# Patient Record
Sex: MALE | Race: WHITE
[De-identification: names, ages, dates, MRNs, and addresses within clinical notes are randomized per-mention and may not be internally consistent; named-entity substitution may affect disease eponyms.]

---

## 2017-04-05 ENCOUNTER — HOSPITAL ENCOUNTER (EMERGENCY)
Dept: HOSPITAL 47 - EC | Age: 27
Discharge: HOME | End: 2017-04-05
Payer: COMMERCIAL

## 2017-04-05 VITALS
DIASTOLIC BLOOD PRESSURE: 52 MMHG | RESPIRATION RATE: 17 BRPM | SYSTOLIC BLOOD PRESSURE: 104 MMHG | HEART RATE: 69 BPM | TEMPERATURE: 97.5 F

## 2017-04-05 DIAGNOSIS — E86.0: ICD-10-CM

## 2017-04-05 DIAGNOSIS — K52.9: Primary | ICD-10-CM

## 2017-04-05 DIAGNOSIS — F17.200: ICD-10-CM

## 2017-04-05 DIAGNOSIS — R11.2: ICD-10-CM

## 2017-04-05 LAB
ALP SERPL-CCNC: 67 U/L (ref 38–126)
ALT SERPL-CCNC: 52 U/L (ref 21–72)
AMYLASE SERPL-CCNC: 120 U/L (ref 30–110)
ANION GAP SERPL CALC-SCNC: 17 MMOL/L
APTT BLD: 21.6 SEC (ref 22–30)
AST SERPL-CCNC: 52 U/L (ref 17–59)
BASOPHILS # BLD AUTO: 0.1 K/UL (ref 0–0.2)
BASOPHILS NFR BLD AUTO: 1 %
BUN SERPL-SCNC: 23 MG/DL (ref 9–20)
CALCIUM SPEC-MCNC: 10.4 MG/DL (ref 8.4–10.2)
CH: 30.3
CHCM: 34.6
CHLORIDE SERPL-SCNC: 101 MMOL/L (ref 98–107)
CO2 SERPL-SCNC: 28 MMOL/L (ref 22–30)
EOSINOPHIL # BLD AUTO: 0.2 K/UL (ref 0–0.7)
EOSINOPHIL NFR BLD AUTO: 2 %
ERYTHROCYTE [DISTWIDTH] IN BLOOD BY AUTOMATED COUNT: 5.65 M/UL (ref 4.3–5.9)
ERYTHROCYTE [DISTWIDTH] IN BLOOD: 13.1 % (ref 11.5–15.5)
GLUCOSE SERPL-MCNC: 101 MG/DL (ref 74–99)
HCT VFR BLD AUTO: 49.7 % (ref 39–53)
HDW: 2.34
HGB BLD-MCNC: 16.9 GM/DL (ref 13–17.5)
INR PPP: 1 (ref ?–1.1)
LUC NFR BLD AUTO: 2 %
LYMPHOCYTES # SPEC AUTO: 1.3 K/UL (ref 1–4.8)
LYMPHOCYTES NFR SPEC AUTO: 15 %
MCH RBC QN AUTO: 30 PG (ref 25–35)
MCHC RBC AUTO-ENTMCNC: 34.1 G/DL (ref 31–37)
MCV RBC AUTO: 88 FL (ref 80–100)
MONOCYTES # BLD AUTO: 0.4 K/UL (ref 0–1)
MONOCYTES NFR BLD AUTO: 5 %
NEUTROPHILS # BLD AUTO: 6.9 K/UL (ref 1.3–7.7)
NEUTROPHILS NFR BLD AUTO: 76 %
NON-AFRICAN AMERICAN GFR(MDRD): >60
PARTICLE COUNT: 7793
PH UR: 6.5 [PH] (ref 5–8)
POTASSIUM SERPL-SCNC: 4.2 MMOL/L (ref 3.5–5.1)
PROT SERPL-MCNC: 8.4 G/DL (ref 6.3–8.2)
PROT UR QL: (no result)
PT BLD: 10.5 SEC (ref 9–12)
RBC UR QL: 1 /HPF (ref 0–5)
SODIUM SERPL-SCNC: 146 MMOL/L (ref 137–145)
SP GR UR: 1.03 (ref 1–1.03)
UA BILLING (MACRO VS. MICRO): (no result)
UROBILINOGEN UR QL STRIP: <2 MG/DL (ref ?–2)
WBC # BLD AUTO: 0.15 10*3/UL
WBC # BLD AUTO: 9 K/UL (ref 3.8–10.6)
WBC #/AREA URNS HPF: 5 /HPF (ref 0–5)
WBC (PEROX): 8.65

## 2017-04-05 PROCEDURE — 83690 ASSAY OF LIPASE: CPT

## 2017-04-05 PROCEDURE — 85025 COMPLETE CBC W/AUTO DIFF WBC: CPT

## 2017-04-05 PROCEDURE — 74000: CPT

## 2017-04-05 PROCEDURE — 99284 EMERGENCY DEPT VISIT MOD MDM: CPT

## 2017-04-05 PROCEDURE — 81001 URINALYSIS AUTO W/SCOPE: CPT

## 2017-04-05 PROCEDURE — 82150 ASSAY OF AMYLASE: CPT

## 2017-04-05 PROCEDURE — 85730 THROMBOPLASTIN TIME PARTIAL: CPT

## 2017-04-05 PROCEDURE — 96375 TX/PRO/DX INJ NEW DRUG ADDON: CPT

## 2017-04-05 PROCEDURE — 36415 COLL VENOUS BLD VENIPUNCTURE: CPT

## 2017-04-05 PROCEDURE — 80053 COMPREHEN METABOLIC PANEL: CPT

## 2017-04-05 PROCEDURE — 96361 HYDRATE IV INFUSION ADD-ON: CPT

## 2017-04-05 PROCEDURE — 85610 PROTHROMBIN TIME: CPT

## 2017-04-05 PROCEDURE — 96374 THER/PROPH/DIAG INJ IV PUSH: CPT

## 2017-04-05 NOTE — ED
General Adult HPI





- General


Source: patient (And girlfriend), RN notes reviewed


Mode of arrival: ambulatory


Limitations: no limitations





<Jacob Madsen - Last Filed: 04/05/17 06:14>





<Mason Pena - Last Filed: 04/05/17 08:25>





- General


Chief complaint: Abdominal Pain


Stated complaint: NAUSEA,VOMITING,ABD PAIN


Time Seen by Provider: 04/05/17 06:09





- History of Present Illness


Initial comments: 





Patient is a pleasant 26-year-old male presenting to emergency Department with 

nausea vomiting abdominal discomfort.  Onset was just the past couple hours.  

Symptoms woke him from sleep.  No known bad food exposure.  No fevers.  No 

history of chronic problems.  Patient has dry heaved several times with one 

episode of emesis.  Patient has discomfort mostly in the epigastric region.  

Majority of history is taken from girlfriend. (Jacob Madsen)





- Related Data


 Previous Rx's











 Medication  Instructions  Recorded


 


Dicyclomine [Bentyl] 10 mg PO TID #15 capsule 04/05/17











 Allergies











Allergy/AdvReac Type Severity Reaction Status Date / Time


 


No Known Allergies Allergy   Verified 04/05/17 07:37














Review of Systems


ROS Other: All systems not noted in ROS Statement are negative.


Constitutional: Denies: fever


Eyes: Denies: eye pain


ENT: Denies: ear pain


Respiratory: Denies: cough


Cardiovascular: Denies: chest pain


Endocrine: Denies: fatigue


Gastrointestinal: Reports: abdominal pain, nausea, vomiting.  Denies: diarrhea


Genitourinary: Denies: dysuria


Musculoskeletal: Denies: back pain


Skin: Denies: rash


Neurological: Denies: weakness





<Jacob Madsen - Last Filed: 04/05/17 06:14>


ROS Other: All systems not noted in ROS Statement are negative.





<Mason Pena - Last Filed: 04/05/17 08:25>


ROS Statement: 


Those systems with pertinent positive or pertinent negative responses have been 

documented in the HPI.








Past Medical History


Past Medical History: No Reported History


History of Any Multi-Drug Resistant Organisms: None Reported


Past Surgical History: No Surgical Hx Reported


Past Psychological History: No Psychological Hx Reported


Smoking Status: Current every day smoker


Past Alcohol Use History: Occasional


Past Drug Use History: None Reported





<Jacob Madsen - Last Filed: 04/05/17 06:14>





General Exam


Limitations: no limitations


General appearance: alert


Head exam: Present: atraumatic, normocephalic


Eye exam: Present: normal appearance


Neck exam: Present: normal inspection


Respiratory exam: Present: normal lung sounds bilaterally


Cardiovascular Exam: Present: regular rate, normal rhythm


  ** Expanded


Peripheral pulses: 2+: Dorsalis Pedis (R), Dorsalis Pedis (L)


GI/Abdominal exam: Present: soft, tenderness (Mild diffuse tenderness), normal 

bowel sounds.  Absent: distended, guarding, rebound, rigid, pulsatile mass


Extremities exam: Present: normal inspection


Neurological exam: Present: alert


Psychiatric exam: Present: normal affect, normal mood


Skin exam: Absent: rash





<Jacob Madsen - Last Filed: 04/05/17 06:14>





Medical Decision Making





<Jacob Madsen - Last Filed: 04/05/17 06:14>





- Lab Data


Result diagrams: 


 04/05/17 06:25





 04/05/17 06:25





- Radiology Data


Radiology results: report reviewed (I did review the x-ray report is consistent 

with enteritis.), image reviewed





<Mason Pena - Last Filed: 04/05/17 08:25>





- Medical Decision Making





I did review the patient's labs and x-rays and did discuss the findings with 

patient and reevaluated on several occasions I did discuss findings also with 

the patient's mother with the patient's permission.  Patient does demonstrate 

evidence of enteritis secondary likely to a viral infection patient will be 

discharged he is feeling much improved at this time.  We discharged and 

instructed to increase oral fluids appropriate medication no for work today. (

Mason Pena)





- Lab Data





 Lab Results











  04/05/17 04/05/17 04/05/17 Range/Units





  06:25 06:25 06:25 


 


WBC   9.0   (3.8-10.6)  k/uL


 


RBC   5.65   (4.30-5.90)  m/uL


 


Hgb   16.9   (13.0-17.5)  gm/dL


 


Hct   49.7   (39.0-53.0)  %


 


MCV   88.0   (80.0-100.0)  fL


 


MCH   30.0   (25.0-35.0)  pg


 


MCHC   34.1   (31.0-37.0)  g/dL


 


RDW   13.1   (11.5-15.5)  %


 


Plt Count   156   (150-450)  k/uL


 


Neutrophils %   76   %


 


Lymphocytes %   15   %


 


Monocytes %   5   %


 


Eosinophils %   2   %


 


Basophils %   1   %


 


Neutrophils #   6.9   (1.3-7.7)  k/uL


 


Lymphocytes #   1.3   (1.0-4.8)  k/uL


 


Monocytes #   0.4   (0-1.0)  k/uL


 


Eosinophils #   0.2   (0-0.7)  k/uL


 


Basophils #   0.1   (0-0.2)  k/uL


 


PT    10.5  (9.0-12.0)  sec


 


INR    1.0  (<1.1)  


 


APTT    21.6 L  (22.0-30.0)  sec


 


Sodium  146 H    (137-145)  mmol/L


 


Potassium  4.2    (3.5-5.1)  mmol/L


 


Chloride  101    ()  mmol/L


 


Carbon Dioxide  28    (22-30)  mmol/L


 


Anion Gap  17    mmol/L


 


BUN  23 H    (9-20)  mg/dL


 


Creatinine  1.29 H    (0.66-1.25)  mg/dL


 


Est GFR (MDRD) Af Amer  >60    (>60 ml/min/1.73 sqM)  


 


Est GFR (MDRD) Non-Af  >60    (>60 ml/min/1.73 sqM)  


 


Glucose  101 H    (74-99)  mg/dL


 


Calcium  10.4 H    (8.4-10.2)  mg/dL


 


Total Bilirubin  0.6    (0.2-1.3)  mg/dL


 


AST  52    (17-59)  U/L


 


ALT  52    (21-72)  U/L


 


Alkaline Phosphatase  67    ()  U/L


 


Total Protein  8.4 H    (6.3-8.2)  g/dL


 


Albumin  5.4 H    (3.5-5.0)  g/dL


 


Amylase  120 H    ()  U/L


 


Lipase  513 H    ()  U/L


 


Urine Color     


 


Urine Appearance     (Clear)  


 


Urine pH     (5.0-8.0)  


 


Ur Specific Gravity     (1.001-1.035)  


 


Urine Protein     (Negative)  


 


Urine Glucose (UA)     (Negative)  


 


Urine Ketones     (Negative)  


 


Urine Blood     (Negative)  


 


Urine Nitrite     (Negative)  


 


Urine Bilirubin     (Negative)  


 


Urine Urobilinogen     (<2.0)  mg/dL


 


Ur Leukocyte Esterase     (Negative)  


 


Urine RBC     (0-5)  /hpf


 


Urine WBC     (0-5)  /hpf


 


Urine Mucus     (None)  /hpf














  04/05/17 Range/Units





  07:42 


 


WBC   (3.8-10.6)  k/uL


 


RBC   (4.30-5.90)  m/uL


 


Hgb   (13.0-17.5)  gm/dL


 


Hct   (39.0-53.0)  %


 


MCV   (80.0-100.0)  fL


 


MCH   (25.0-35.0)  pg


 


MCHC   (31.0-37.0)  g/dL


 


RDW   (11.5-15.5)  %


 


Plt Count   (150-450)  k/uL


 


Neutrophils %   %


 


Lymphocytes %   %


 


Monocytes %   %


 


Eosinophils %   %


 


Basophils %   %


 


Neutrophils #   (1.3-7.7)  k/uL


 


Lymphocytes #   (1.0-4.8)  k/uL


 


Monocytes #   (0-1.0)  k/uL


 


Eosinophils #   (0-0.7)  k/uL


 


Basophils #   (0-0.2)  k/uL


 


PT   (9.0-12.0)  sec


 


INR   (<1.1)  


 


APTT   (22.0-30.0)  sec


 


Sodium   (137-145)  mmol/L


 


Potassium   (3.5-5.1)  mmol/L


 


Chloride   ()  mmol/L


 


Carbon Dioxide   (22-30)  mmol/L


 


Anion Gap   mmol/L


 


BUN   (9-20)  mg/dL


 


Creatinine   (0.66-1.25)  mg/dL


 


Est GFR (MDRD) Af Amer   (>60 ml/min/1.73 sqM)  


 


Est GFR (MDRD) Non-Af   (>60 ml/min/1.73 sqM)  


 


Glucose   (74-99)  mg/dL


 


Calcium   (8.4-10.2)  mg/dL


 


Total Bilirubin   (0.2-1.3)  mg/dL


 


AST   (17-59)  U/L


 


ALT   (21-72)  U/L


 


Alkaline Phosphatase   ()  U/L


 


Total Protein   (6.3-8.2)  g/dL


 


Albumin   (3.5-5.0)  g/dL


 


Amylase   ()  U/L


 


Lipase   ()  U/L


 


Urine Color  Yellow  


 


Urine Appearance  Clear  (Clear)  


 


Urine pH  6.5  (5.0-8.0)  


 


Ur Specific Gravity  1.026  (1.001-1.035)  


 


Urine Protein  1+ H  (Negative)  


 


Urine Glucose (UA)  Negative  (Negative)  


 


Urine Ketones  Trace H  (Negative)  


 


Urine Blood  Negative  (Negative)  


 


Urine Nitrite  Negative  (Negative)  


 


Urine Bilirubin  Negative  (Negative)  


 


Urine Urobilinogen  <2.0  (<2.0)  mg/dL


 


Ur Leukocyte Esterase  Small H  (Negative)  


 


Urine RBC  1  (0-5)  /hpf


 


Urine WBC  5  (0-5)  /hpf


 


Urine Mucus  Few H  (None)  /hpf














Disposition





<Jacob Madsen - Last Filed: 04/05/17 06:14>





<Mason Pena - Last Filed: 04/05/17 08:25>


Clinical Impression: 


 Gastroenteritis, Dehydration, Abdominal pain





Disposition: HOME SELF-CARE


Condition: Good


Instructions:  Abdominal Pain (ED), Gastroenteritis (ED), Dehydration (ED)


Prescriptions: 


Dicyclomine [Bentyl] 10 mg PO TID #15 capsule

## 2017-04-05 NOTE — XR
EXAMINATION TYPE: XR KUB

 

DATE OF EXAM: 4/5/2017 6:46 AM

 

CLINICAL DATA:  26-year-old male with abdominal pain, epigastric pain with nausea and vomiting, PHH

 

COMPARISON:  None

 

FINDINGS:

 

Lung bases are clear. 

 

No evidence for free intraperitoneal air. 

 

No dilated small bowel. Air is seen throughout the colon with multiple air-fluid levels. No abnormal 
bowel dilatation. There is mild stool in the distal colon.

 

No suspicious calcifications seen.

 

 

IMPRESSION:

 

1. Air-fluid levels throughout the colon suggesting liquid stool. Correlate for enteritis.

2. No evidence of bowel obstruction or free intraperitoneal air.

## 2018-06-10 ENCOUNTER — HOSPITAL ENCOUNTER (EMERGENCY)
Dept: HOSPITAL 47 - EC | Age: 28
Discharge: HOME | End: 2018-06-10
Payer: COMMERCIAL

## 2018-06-10 VITALS — TEMPERATURE: 97.4 F

## 2018-06-10 VITALS — SYSTOLIC BLOOD PRESSURE: 115 MMHG | RESPIRATION RATE: 16 BRPM | HEART RATE: 44 BPM | DIASTOLIC BLOOD PRESSURE: 69 MMHG

## 2018-06-10 DIAGNOSIS — F17.200: ICD-10-CM

## 2018-06-10 DIAGNOSIS — R10.11: Primary | ICD-10-CM

## 2018-06-10 DIAGNOSIS — R11.2: ICD-10-CM

## 2018-06-10 LAB
ALBUMIN SERPL-MCNC: 4.3 G/DL (ref 3.5–5)
ALP SERPL-CCNC: 51 U/L (ref 38–126)
ALT SERPL-CCNC: 44 U/L (ref 21–72)
AMYLASE SERPL-CCNC: 53 U/L (ref 30–110)
ANION GAP SERPL CALC-SCNC: 14 MMOL/L
APTT BLD: 24.6 SEC (ref 22–30)
AST SERPL-CCNC: 28 U/L (ref 17–59)
BASOPHILS # BLD AUTO: 0 K/UL (ref 0–0.2)
BASOPHILS NFR BLD AUTO: 1 %
BUN SERPL-SCNC: 15 MG/DL (ref 9–20)
CALCIUM SPEC-MCNC: 9.6 MG/DL (ref 8.4–10.2)
CHLORIDE SERPL-SCNC: 99 MMOL/L (ref 98–107)
CO2 SERPL-SCNC: 29 MMOL/L (ref 22–30)
EOSINOPHIL # BLD AUTO: 0.1 K/UL (ref 0–0.7)
EOSINOPHIL NFR BLD AUTO: 1 %
ERYTHROCYTE [DISTWIDTH] IN BLOOD BY AUTOMATED COUNT: 5.13 M/UL (ref 4.3–5.9)
ERYTHROCYTE [DISTWIDTH] IN BLOOD: 12.8 % (ref 11.5–15.5)
GLUCOSE SERPL-MCNC: 102 MG/DL (ref 74–99)
HCT VFR BLD AUTO: 44.7 % (ref 39–53)
HGB BLD-MCNC: 15.6 GM/DL (ref 13–17.5)
INR PPP: 1.1 (ref ?–1.2)
LIPASE SERPL-CCNC: 19 U/L (ref 23–300)
LYMPHOCYTES # SPEC AUTO: 1.9 K/UL (ref 1–4.8)
LYMPHOCYTES NFR SPEC AUTO: 23 %
MCH RBC QN AUTO: 30.3 PG (ref 25–35)
MCHC RBC AUTO-ENTMCNC: 34.8 G/DL (ref 31–37)
MCV RBC AUTO: 87.1 FL (ref 80–100)
MONOCYTES # BLD AUTO: 0.4 K/UL (ref 0–1)
MONOCYTES NFR BLD AUTO: 5 %
NEUTROPHILS # BLD AUTO: 5.9 K/UL (ref 1.3–7.7)
NEUTROPHILS NFR BLD AUTO: 69 %
PH UR: 7 [PH] (ref 5–8)
PLATELET # BLD AUTO: 148 K/UL (ref 150–450)
POTASSIUM SERPL-SCNC: 3.9 MMOL/L (ref 3.5–5.1)
PROT SERPL-MCNC: 6.5 G/DL (ref 6.3–8.2)
PT BLD: 10.5 SEC (ref 9–12)
RBC UR QL: 5 /HPF (ref 0–5)
SODIUM SERPL-SCNC: 142 MMOL/L (ref 137–145)
SP GR UR: 1.02 (ref 1–1.03)
SQUAMOUS UR QL AUTO: <1 /HPF (ref 0–4)
UROBILINOGEN UR QL STRIP: <2 MG/DL (ref ?–2)
WBC # BLD AUTO: 8.6 K/UL (ref 3.8–10.6)

## 2018-06-10 PROCEDURE — 80053 COMPREHEN METABOLIC PANEL: CPT

## 2018-06-10 PROCEDURE — 82150 ASSAY OF AMYLASE: CPT

## 2018-06-10 PROCEDURE — 96374 THER/PROPH/DIAG INJ IV PUSH: CPT

## 2018-06-10 PROCEDURE — 81001 URINALYSIS AUTO W/SCOPE: CPT

## 2018-06-10 PROCEDURE — 85730 THROMBOPLASTIN TIME PARTIAL: CPT

## 2018-06-10 PROCEDURE — 99284 EMERGENCY DEPT VISIT MOD MDM: CPT

## 2018-06-10 PROCEDURE — 83690 ASSAY OF LIPASE: CPT

## 2018-06-10 PROCEDURE — 36415 COLL VENOUS BLD VENIPUNCTURE: CPT

## 2018-06-10 PROCEDURE — 85610 PROTHROMBIN TIME: CPT

## 2018-06-10 PROCEDURE — 96361 HYDRATE IV INFUSION ADD-ON: CPT

## 2018-06-10 PROCEDURE — 85025 COMPLETE CBC W/AUTO DIFF WBC: CPT

## 2018-06-10 NOTE — ED
Abdominal Pain HPI





- General


Chief Complaint: Abdominal Pain


Stated Complaint: Abdominal Pain


Time Seen by Provider: 06/10/18 21:11


Source: patient, RN notes reviewed


Mode of arrival: ambulatory


Limitations: no limitations





- History of Present Illness


Initial Comments: 


This is a 28-year-old male who presents to the emergency department with chief 

complaint of abdominal pain.  Patient is accompanied by his father who 

contributes to history.  Patient states that for the past 6-8 months he has 

been experiencing intermittent abdominal pain.  He states that he has been seen 

here 3 times and at [facility] 2 times.  His last visit to [facility] was 2 days ago 

where he did have a computed tomography scan performed.  Patient reports right 

upper quadrant abdominal pain that feels like he is being punched in the gut.  

He states the pain is made worse after eating.  He states that over the past 6 

months he has lost 15 pounds.  He states he is unable to eat.  He admits to 

associated nausea and vomiting.  Denies fevers or chills, chest pain shortness 

breath, constipation or diarrhea.  He denies any flank pain.  Denies dysuria or 

hematuria.  Father is at bedside and states that he is very concerned.  He 

states that patient is normally very active and well and not something is going 

on.  He states the patient does have a follow-up with gastroenterology in 3 

weeks but that patient cannot wait that long.








- Related Data


 Previous Rx's











 Medication  Instructions  Recorded


 


Dicyclomine [Bentyl] 10 mg PO TID #15 capsule 04/05/17











 Allergies











Allergy/AdvReac Type Severity Reaction Status Date / Time


 


No Known Allergies Allergy   Verified 06/10/18 20:39














Review of Systems


ROS Statement: 


Those systems with pertinent positive or pertinent negative responses have been 

documented in the HPI.





ROS Other: All systems not noted in ROS Statement are negative.





Past Medical History


Past Medical History: No Reported History


History of Any Multi-Drug Resistant Organisms: None Reported


Past Surgical History: No Surgical Hx Reported


Past Psychological History: No Psychological Hx Reported


Smoking Status: Current every day smoker


Past Alcohol Use History: Occasional


Past Drug Use History: None Reported





General Exam





- General Exam Comments


Initial Comments: 


General: Awake and alert, well-developed; in no apparent distress.


HEENT: Head atraumatic, normocephalic. Pupils are equal, round and reactive to 

light. Extraocular movements intact. Oropharynx moist without erythema or 

exudate. 


Neck: Supple. Normal ROM. 


Cardiovascular: Regular rate and rhythm. No murmurs, rubs or gallops. Chest 

symmetrical.  


Respiratory: Lungs clear to auscultation bilaterally. No wheezes, rales or 

rhonchi. Normal respiratory effort with no use of accessory muscles. 


Abdomen: Soft, non-distended.  Mild tenderness on palpation of right upper 

quadrant.  No rigidity, rebound or guarding. Normal bowel sounds in all 4 

quadrants. 


Musculoskeletal: Normal ROM, no tenderness bilateral upper and lower 

extremities. Ambulating normally. 


Skin: Pink, warm and dry without rashes or lesions. 


Neurological: Alert and oriented x3. CN II-XII grossly intact. Speech is fluent 

and answers are appropriate. No focal neuro deficits. 


Psychiatric: Normal mood and affect. No overt signs of depression or anxiety 

noted. 














Limitations: no limitations





Course


 Vital Signs











  06/10/18





  20:37


 


Temperature 97.4 F L


 


Pulse Rate 52 L


 


Respiratory 18





Rate 


 


Blood Pressure 103/60


 


O2 Sat by Pulse 98





Oximetry 














Medical Decision Making





- Medical Decision Making


This is a 28-year-old male who presents to the emergency department with chief 

complaint of abdominal pain.  Patient has been having intermittent abdominal 

pain for the past 6-8 months.  He has been evaluated multiple times at this ER 

and [facility].  He was seen at [facility] 2 days ago with the same 

issue.  Patient states that his symptoms have not worsened but that they have 

not subsided.  He complains of right upper quadrant abdominal pain, nausea and 

vomiting.  CBC, CMP, coags and UA were unremarkable.  I was able to obtain the 

report from the computed tomography scan the patient had performed 2 days ago 

at [facility].  This revealed a tiny amount of free fluid in the pelvis but no 

definitive minute other acute abnormality identified.  While in the emergency 

department, patient's vital signs have been stable and he is in no acute 

distress.  Patient will be given contact information for follow-up to GI.  

Patient is in agreement with plan voices understanding.  He is in no acute 

distress at this time.  All questions answered.








- Lab Data


Result diagrams: 


 06/10/18 21:02





 06/10/18 21:02


 Lab Results











  06/10/18 06/10/18 06/10/18 Range/Units





  21:02 21:02 21:02 


 


WBC   8.6   (3.8-10.6)  k/uL


 


RBC   5.13   (4.30-5.90)  m/uL


 


Hgb   15.6   (13.0-17.5)  gm/dL


 


Hct   44.7   (39.0-53.0)  %


 


MCV   87.1   (80.0-100.0)  fL


 


MCH   30.3   (25.0-35.0)  pg


 


MCHC   34.8   (31.0-37.0)  g/dL


 


RDW   12.8   (11.5-15.5)  %


 


Plt Count   148 L   (150-450)  k/uL


 


Neutrophils %   69   %


 


Lymphocytes %   23   %


 


Monocytes %   5   %


 


Eosinophils %   1   %


 


Basophils %   1   %


 


Neutrophils #   5.9   (1.3-7.7)  k/uL


 


Lymphocytes #   1.9   (1.0-4.8)  k/uL


 


Monocytes #   0.4   (0-1.0)  k/uL


 


Eosinophils #   0.1   (0-0.7)  k/uL


 


Basophils #   0.0   (0-0.2)  k/uL


 


PT    10.5  (9.0-12.0)  sec


 


INR    1.1  (<1.2)  


 


APTT    24.6  (22.0-30.0)  sec


 


Sodium  142    (137-145)  mmol/L


 


Potassium  3.9    (3.5-5.1)  mmol/L


 


Chloride  99    ()  mmol/L


 


Carbon Dioxide  29    (22-30)  mmol/L


 


Anion Gap  14    mmol/L


 


BUN  15    (9-20)  mg/dL


 


Creatinine  1.00    (0.66-1.25)  mg/dL


 


Est GFR (CKD-EPI)AfAm  >90    (>60 ml/min/1.73 sqM)  


 


Est GFR (CKD-EPI)NonAf  >90    (>60 ml/min/1.73 sqM)  


 


Glucose  102 H    (74-99)  mg/dL


 


Calcium  9.6    (8.4-10.2)  mg/dL


 


Total Bilirubin  0.2    (0.2-1.3)  mg/dL


 


AST  28    (17-59)  U/L


 


ALT  44    (21-72)  U/L


 


Alkaline Phosphatase  51    ()  U/L


 


Total Protein  6.5    (6.3-8.2)  g/dL


 


Albumin  4.3    (3.5-5.0)  g/dL


 


Amylase  53    ()  U/L


 


Lipase  19 L    ()  U/L


 


Urine Color     


 


Urine Appearance     (Clear)  


 


Urine pH     (5.0-8.0)  


 


Ur Specific Gravity     (1.001-1.035)  


 


Urine Protein     (Negative)  


 


Urine Glucose (UA)     (Negative)  


 


Urine Ketones     (Negative)  


 


Urine Blood     (Negative)  


 


Urine Nitrite     (Negative)  


 


Urine Bilirubin     (Negative)  


 


Urine Urobilinogen     (<2.0)  mg/dL


 


Ur Leukocyte Esterase     (Negative)  


 


Urine RBC     (0-5)  /hpf


 


Ur Squamous Epith Cells     (0-4)  /hpf


 


Amorphous Sediment     (None)  /hpf


 


Urine Mucus     (None)  /hpf














  06/10/18 Range/Units





  21:02 


 


WBC   (3.8-10.6)  k/uL


 


RBC   (4.30-5.90)  m/uL


 


Hgb   (13.0-17.5)  gm/dL


 


Hct   (39.0-53.0)  %


 


MCV   (80.0-100.0)  fL


 


MCH   (25.0-35.0)  pg


 


MCHC   (31.0-37.0)  g/dL


 


RDW   (11.5-15.5)  %


 


Plt Count   (150-450)  k/uL


 


Neutrophils %   %


 


Lymphocytes %   %


 


Monocytes %   %


 


Eosinophils %   %


 


Basophils %   %


 


Neutrophils #   (1.3-7.7)  k/uL


 


Lymphocytes #   (1.0-4.8)  k/uL


 


Monocytes #   (0-1.0)  k/uL


 


Eosinophils #   (0-0.7)  k/uL


 


Basophils #   (0-0.2)  k/uL


 


PT   (9.0-12.0)  sec


 


INR   (<1.2)  


 


APTT   (22.0-30.0)  sec


 


Sodium   (137-145)  mmol/L


 


Potassium   (3.5-5.1)  mmol/L


 


Chloride   ()  mmol/L


 


Carbon Dioxide   (22-30)  mmol/L


 


Anion Gap   mmol/L


 


BUN   (9-20)  mg/dL


 


Creatinine   (0.66-1.25)  mg/dL


 


Est GFR (CKD-EPI)AfAm   (>60 ml/min/1.73 sqM)  


 


Est GFR (CKD-EPI)NonAf   (>60 ml/min/1.73 sqM)  


 


Glucose   (74-99)  mg/dL


 


Calcium   (8.4-10.2)  mg/dL


 


Total Bilirubin   (0.2-1.3)  mg/dL


 


AST   (17-59)  U/L


 


ALT   (21-72)  U/L


 


Alkaline Phosphatase   ()  U/L


 


Total Protein   (6.3-8.2)  g/dL


 


Albumin   (3.5-5.0)  g/dL


 


Amylase   ()  U/L


 


Lipase   ()  U/L


 


Urine Color  Yellow  


 


Urine Appearance  Cloudy  (Clear)  


 


Urine pH  7.0  (5.0-8.0)  


 


Ur Specific Gravity  1.019  (1.001-1.035)  


 


Urine Protein  Trace H  (Negative)  


 


Urine Glucose (UA)  Negative  (Negative)  


 


Urine Ketones  Negative  (Negative)  


 


Urine Blood  Negative  (Negative)  


 


Urine Nitrite  Negative  (Negative)  


 


Urine Bilirubin  Negative  (Negative)  


 


Urine Urobilinogen  <2.0  (<2.0)  mg/dL


 


Ur Leukocyte Esterase  Negative  (Negative)  


 


Urine RBC  5  (0-5)  /hpf


 


Ur Squamous Epith Cells  <1  (0-4)  /hpf


 


Amorphous Sediment  Occasional H  (None)  /hpf


 


Urine Mucus  Rare H  (None)  /hpf














Disposition


Clinical Impression: 


 Abdominal pain, Nausea and vomiting





Disposition: HOME SELF-CARE


Condition: Good


Instructions:  Abdominal Pain (ED)


Additional Instructions: 


Please follow-up with Dr. Esqueda, gastroenterology within 1-2 days. Please 

follow up with primary care provider within 1-2 days. Return to emergency 

department if symptoms should worsen or any concerns arise. 


Is patient prescribed a controlled substance at d/c from ED?: No


Referrals: 


Gregg Pastor MD [Primary Care Provider] - 1-2 days


Danay Esqueda MD [STAFF PHYSICIAN] - 1-2 days


Time of Disposition: 22:23

## 2018-06-20 ENCOUNTER — HOSPITAL ENCOUNTER (OUTPATIENT)
Dept: HOSPITAL 47 - RADNMMAIN | Age: 28
Discharge: HOME | End: 2018-06-20
Payer: COMMERCIAL

## 2018-06-20 DIAGNOSIS — R10.13: Primary | ICD-10-CM

## 2018-06-20 PROCEDURE — 78226 HEPATOBILIARY SYSTEM IMAGING: CPT

## 2018-06-20 NOTE — NM
EXAMINATION TYPE: NM hepatobiliary w EF

 

DATE OF EXAM: 6/20/2018

 

COMPARISON: NONE

 

INDICATION: Epigastric pain

 

TECHNIQUE: After the intravenous administration of 5.01 mCi Tc 99m Mebrofenin hepatobiliary scintigra
phy is performed.  Images were obtained immediately post injection.

 

FINDINGS: 

There is prompt uptake and excretion of radiotracer by the liver.

Extrahepatic ducts are identified at 8 minutes.  

The gallbladder is visualized within 11 minutes.  

Small bowel activity is noted within 60 minutes.  

 

At one hour 8 ounces of oral ensure plus is given to mimic CCK and gallbladder ejection fraction is c
alculated at 68 %, which is  in the normal range.  (Normal >35% and <80%.).

 

IMPRESSION:

1.  Normal hepatobiliary scan

## 2018-07-02 ENCOUNTER — HOSPITAL ENCOUNTER (OUTPATIENT)
Dept: HOSPITAL 47 - ORWHC2ENDO | Age: 28
Discharge: HOME | End: 2018-07-02
Payer: COMMERCIAL

## 2018-07-02 VITALS — RESPIRATION RATE: 18 BRPM | SYSTOLIC BLOOD PRESSURE: 117 MMHG | DIASTOLIC BLOOD PRESSURE: 74 MMHG

## 2018-07-02 VITALS — TEMPERATURE: 97.8 F

## 2018-07-02 VITALS — BODY MASS INDEX: 25.1 KG/M2

## 2018-07-02 VITALS — HEART RATE: 51 BPM

## 2018-07-02 DIAGNOSIS — Z79.899: ICD-10-CM

## 2018-07-02 DIAGNOSIS — F17.210: ICD-10-CM

## 2018-07-02 DIAGNOSIS — K44.9: ICD-10-CM

## 2018-07-02 DIAGNOSIS — K21.0: ICD-10-CM

## 2018-07-02 DIAGNOSIS — F98.8: ICD-10-CM

## 2018-07-02 DIAGNOSIS — K29.50: Primary | ICD-10-CM

## 2018-07-02 DIAGNOSIS — N18.2: ICD-10-CM

## 2018-07-02 PROCEDURE — 43239 EGD BIOPSY SINGLE/MULTIPLE: CPT

## 2018-07-02 PROCEDURE — 88305 TISSUE EXAM BY PATHOLOGIST: CPT

## 2018-07-02 NOTE — P.PCN
Date of Procedure: 07/02/18


Procedure(s) Performed: 


Procedure: Esophagogastroduodenoscopy and biopsy.





Preoperative diagnosis: Recurrent episodes of epigastric pain.





Postoperative diagnosis: 1. Small sliding hiatal hernia with LA grade B distal 

esophagitis.  2. Mild antral gastritis.  3. Biopsies obtained from the duodenum

, antrum and esophagus.





Preparation sedation: Was provided by anesthesia.





Brief clinical history: The patient is a 28-year-old male who was evaluated in 

the office last month for recurrent episodes of epigastric pain of around 1 

year duration.  The typical episodes last 1-2 days and he has been in the 

emergency room with these episodes and was told that he had a virus illness and 

discharged.  His last episode was early in June.  The patient has long-standing 

history of acid reflux and has used Prilosec intermittently over the years and 

recently he has been on Carafate.  Gallbladder workup was negative.  This 

evaluation is to assess for peptic ulcer disease complicated reflux disease or 

other pathology.





Procedure: With the patient on his left lateral decubitus position and after 

informed consent and adequate sedation, I passed the Olympus- video 

upper endoscope through the cricopharyngeus down the esophagus.  GE junction 

was around 41 cm from the incisors and there was a small sliding hiatal hernia 

measuring between 1 and 2 cm.  The esophagus showed evidence of LA grade B 

distal esophagitis with multiple small punctate erosions but there were no 

ulcers or strictures or Capps's esophagus.  The endoscope was then passed 

into the stomach which was insufflated with air and inspected in detail 

including the retroflex view in the cardia.  There was some mottling and 

erythema in the antrum but no ulcers or erosions.  Pyloric channel, duodenal 

bulb, post bulbar area and descending duodenum appeared within normal limits.  

Because of his symptoms, I obtained biopsies from the duodenum, antrum and 

esophagus then the endoscope was withdrawn.





The patient tolerated the procedure well.





Plan: The patient was reassured.  Will await biopsy results.  He will follow up 

in the office as planned and would keep you updated on his progress.